# Patient Record
Sex: FEMALE | Race: WHITE | Employment: FULL TIME | URBAN - METROPOLITAN AREA
[De-identification: names, ages, dates, MRNs, and addresses within clinical notes are randomized per-mention and may not be internally consistent; named-entity substitution may affect disease eponyms.]

---

## 2017-06-06 ENCOUNTER — OFFICE VISIT (OUTPATIENT)
Dept: FAMILY MEDICINE CLINIC | Facility: CLINIC | Age: 27
End: 2017-06-06

## 2017-06-06 VITALS
DIASTOLIC BLOOD PRESSURE: 64 MMHG | TEMPERATURE: 98 F | HEART RATE: 84 BPM | SYSTOLIC BLOOD PRESSURE: 96 MMHG | RESPIRATION RATE: 16 BRPM | HEIGHT: 66.5 IN | BODY MASS INDEX: 21.76 KG/M2 | WEIGHT: 137 LBS | OXYGEN SATURATION: 97 %

## 2017-06-06 DIAGNOSIS — J03.90 EXUDATIVE TONSILLITIS: Primary | ICD-10-CM

## 2017-06-06 DIAGNOSIS — R59.0 CERVICAL LYMPHADENOPATHY: ICD-10-CM

## 2017-06-06 PROCEDURE — 86308 HETEROPHILE ANTIBODY SCREEN: CPT | Performed by: NURSE PRACTITIONER

## 2017-06-06 PROCEDURE — 87880 STREP A ASSAY W/OPTIC: CPT | Performed by: NURSE PRACTITIONER

## 2017-06-06 PROCEDURE — 99213 OFFICE O/P EST LOW 20 MIN: CPT | Performed by: NURSE PRACTITIONER

## 2017-06-06 PROCEDURE — 87081 CULTURE SCREEN ONLY: CPT | Performed by: NURSE PRACTITIONER

## 2017-06-06 RX ORDER — CEPHALEXIN 500 MG/1
500 CAPSULE ORAL 2 TIMES DAILY
Qty: 20 CAPSULE | Refills: 0 | Status: SHIPPED | OUTPATIENT
Start: 2017-06-06 | End: 2017-06-16

## 2017-06-06 NOTE — PROGRESS NOTES
CHIEF COMPLAINT:   Patient presents with:  Sore Throat: swollen glands, and body aches      HPI:   Siena Veliz is a 23year old female presents to clinic with symptoms of sore throat. Patient has had for 3 days. Symptoms have worsening since onset.   P THROAT: oral mucosa pink, moist. Posterior pharynx very erythematous and injected. Left tonsillar exudate. Tonsils +2/4. Breath is malodorous. No trismus, hoarseness, muffled voice, stridor, or uvular deviation.     NECK: supple  LUNGS: clear to auscultat Sig: Take 1 capsule (500 mg total) by mouth 2 (two) times daily. Patient Instructions     Pharyngitis (Sore Throat), Report Pending    Pharyngitis (sore throat) is often due to a virus.  It can also be caused by the streptococcus, or strep, · For children: Use acetaminophen for fever, fussiness, or discomfort.  In infants older than 10months of age, you may use ibuprofen instead of acetaminophen. Talk with your child's healthcare provider before giving these medicines if your child has chronic · Signs of dehydration (very dark urine or no urine, sunken eyes, dizziness)  · Trouble breathing or noisy breathing  · Muffled voice  · New rash  · Child appears to be getting sicker  Date Last Reviewed: 4/13/2015  © 9759-7895 [employer]. 7

## (undated) NOTE — MR AVS SNAPSHOT
[facility]  [address] [id number] [phone]               Thank you for choosing us for your health care visit with LYDIA Shin. We are glad to serve you and happy to provide you with this summary of your visit.   Cecilia provider if you were not given your test results. If the test is positive for strep infection, you will need to take antibiotic medicines. A prescription can be called into your pharmacy at that time.  If the test is negative, you probably have a viral phar dissolve 1/2 teaspoon of salt in 1 glass of warm water. To help soothe a sore throat, children can sip on juice or a popsicle. Children 5 years and older can also suck on a lollipop or hard candy. · Don't eat salty or spicy foods.  These can irritate the t Take 1 tablet by mouth daily. cephALEXin 500 MG Caps   Take 1 capsule (500 mg total) by mouth 2 (two) times daily.    Commonly known as:  Evonne Estes 3-0.02 MG Tabs   Generic drug:  Drospirenone-Ethinyl Estradiol                Where